# Patient Record
Sex: FEMALE | Race: WHITE | NOT HISPANIC OR LATINO | Employment: OTHER | URBAN - METROPOLITAN AREA
[De-identification: names, ages, dates, MRNs, and addresses within clinical notes are randomized per-mention and may not be internally consistent; named-entity substitution may affect disease eponyms.]

---

## 2018-04-17 ENCOUNTER — APPOINTMENT (OUTPATIENT)
Dept: GENERAL RADIOLOGY | Facility: HOSPITAL | Age: 58
End: 2018-04-17

## 2018-04-17 ENCOUNTER — HOSPITAL ENCOUNTER (EMERGENCY)
Facility: HOSPITAL | Age: 58
Discharge: HOME | End: 2018-04-17
Attending: EMERGENCY MEDICINE | Admitting: EMERGENCY MEDICINE

## 2018-04-17 VITALS
DIASTOLIC BLOOD PRESSURE: 83 MMHG | OXYGEN SATURATION: 99 % | RESPIRATION RATE: 16 BRPM | TEMPERATURE: 97.9 F | HEART RATE: 42 BPM | SYSTOLIC BLOOD PRESSURE: 129 MMHG

## 2018-04-17 DIAGNOSIS — R07.9 CHEST PAIN, UNSPECIFIED TYPE: Primary | ICD-10-CM

## 2018-04-17 LAB
ALBUMIN SERPL-MCNC: 4.1 G/DL (ref 3.2–4.8)
ALBUMIN/GLOB SERPL: 1.2 G/DL (ref 1.5–2.5)
ALP SERPL-CCNC: 93 U/L (ref 25–100)
ALT SERPL W P-5'-P-CCNC: 28 U/L (ref 7–40)
ANION GAP SERPL CALCULATED.3IONS-SCNC: 8 MMOL/L (ref 3–11)
AST SERPL-CCNC: 31 U/L (ref 0–33)
BASOPHILS # BLD AUTO: 0.06 10*3/MM3 (ref 0–0.2)
BASOPHILS NFR BLD AUTO: 1 % (ref 0–1)
BILIRUB SERPL-MCNC: 0.9 MG/DL (ref 0.3–1.2)
BUN BLD-MCNC: 15 MG/DL (ref 9–23)
BUN/CREAT SERPL: 15 (ref 7–25)
CALCIUM SPEC-SCNC: 9.1 MG/DL (ref 8.7–10.4)
CHLORIDE SERPL-SCNC: 107 MMOL/L (ref 99–109)
CO2 SERPL-SCNC: 27 MMOL/L (ref 20–31)
CREAT BLD-MCNC: 1 MG/DL (ref 0.6–1.3)
DEPRECATED RDW RBC AUTO: 52 FL (ref 37–54)
EOSINOPHIL # BLD AUTO: 0.05 10*3/MM3 (ref 0–0.3)
EOSINOPHIL NFR BLD AUTO: 0.8 % (ref 0–3)
ERYTHROCYTE [DISTWIDTH] IN BLOOD BY AUTOMATED COUNT: 13.7 % (ref 11.3–14.5)
GFR SERPL CREATININE-BSD FRML MDRD: 57 ML/MIN/1.73
GLOBULIN UR ELPH-MCNC: 3.5 GM/DL
GLUCOSE BLD-MCNC: 102 MG/DL (ref 70–100)
HCT VFR BLD AUTO: 48.1 % (ref 34.5–44)
HGB BLD-MCNC: 16.3 G/DL (ref 11.5–15.5)
HOLD SPECIMEN: NORMAL
HOLD SPECIMEN: NORMAL
IMM GRANULOCYTES # BLD: 0.01 10*3/MM3 (ref 0–0.03)
IMM GRANULOCYTES NFR BLD: 0.2 % (ref 0–0.6)
INR PPP: 0.96 (ref 0.91–1.09)
LYMPHOCYTES # BLD AUTO: 1.57 10*3/MM3 (ref 0.6–4.8)
LYMPHOCYTES NFR BLD AUTO: 26.3 % (ref 24–44)
MCH RBC QN AUTO: 34.7 PG (ref 27–31)
MCHC RBC AUTO-ENTMCNC: 33.9 G/DL (ref 32–36)
MCV RBC AUTO: 102.3 FL (ref 80–99)
MONOCYTES # BLD AUTO: 0.37 10*3/MM3 (ref 0–1)
MONOCYTES NFR BLD AUTO: 6.2 % (ref 0–12)
NEUTROPHILS # BLD AUTO: 3.92 10*3/MM3 (ref 1.5–8.3)
NEUTROPHILS NFR BLD AUTO: 65.5 % (ref 41–71)
PLATELET # BLD AUTO: 245 10*3/MM3 (ref 150–450)
PMV BLD AUTO: 10.3 FL (ref 6–12)
POTASSIUM BLD-SCNC: 4.6 MMOL/L (ref 3.5–5.5)
PROT SERPL-MCNC: 7.6 G/DL (ref 5.7–8.2)
PROTHROMBIN TIME: 10.1 SECONDS (ref 9.6–11.5)
RBC # BLD AUTO: 4.7 10*6/MM3 (ref 3.89–5.14)
SODIUM BLD-SCNC: 142 MMOL/L (ref 132–146)
TROPONIN I SERPL-MCNC: 0 NG/ML (ref 0–0.07)
TROPONIN I SERPL-MCNC: 0.01 NG/ML (ref 0–0.07)
TSH SERPL DL<=0.05 MIU/L-ACNC: 2.82 MIU/ML (ref 0.35–5.35)
WBC NRBC COR # BLD: 5.98 10*3/MM3 (ref 3.5–10.8)
WHOLE BLOOD HOLD SPECIMEN: NORMAL
WHOLE BLOOD HOLD SPECIMEN: NORMAL

## 2018-04-17 PROCEDURE — 93005 ELECTROCARDIOGRAM TRACING: CPT | Performed by: EMERGENCY MEDICINE

## 2018-04-17 PROCEDURE — 71045 X-RAY EXAM CHEST 1 VIEW: CPT

## 2018-04-17 PROCEDURE — 84443 ASSAY THYROID STIM HORMONE: CPT | Performed by: EMERGENCY MEDICINE

## 2018-04-17 PROCEDURE — 36415 COLL VENOUS BLD VENIPUNCTURE: CPT

## 2018-04-17 PROCEDURE — 85025 COMPLETE CBC W/AUTO DIFF WBC: CPT | Performed by: EMERGENCY MEDICINE

## 2018-04-17 PROCEDURE — 85610 PROTHROMBIN TIME: CPT | Performed by: EMERGENCY MEDICINE

## 2018-04-17 PROCEDURE — 99283 EMERGENCY DEPT VISIT LOW MDM: CPT

## 2018-04-17 PROCEDURE — 84484 ASSAY OF TROPONIN QUANT: CPT

## 2018-04-17 PROCEDURE — 80053 COMPREHEN METABOLIC PANEL: CPT | Performed by: EMERGENCY MEDICINE

## 2018-04-17 RX ORDER — ASPIRIN 81 MG/1
324 TABLET, CHEWABLE ORAL ONCE
Status: DISCONTINUED | OUTPATIENT
Start: 2018-04-17 | End: 2018-04-17 | Stop reason: HOSPADM

## 2018-04-17 RX ORDER — SODIUM CHLORIDE 0.9 % (FLUSH) 0.9 %
10 SYRINGE (ML) INJECTION AS NEEDED
Status: DISCONTINUED | OUTPATIENT
Start: 2018-04-17 | End: 2018-04-17 | Stop reason: HOSPADM

## 2018-04-17 RX ORDER — CALCIUM POLYCARBOPHIL 625 MG 625 MG/1
625 TABLET ORAL DAILY
COMMUNITY

## 2018-04-17 RX ORDER — RANITIDINE 150 MG/1
150 TABLET ORAL DAILY
COMMUNITY

## 2018-04-17 RX ORDER — NYSTATIN 100000 U/G
1 CREAM TOPICAL AS NEEDED
COMMUNITY

## 2018-04-17 RX ORDER — L.ACID,PARA/B.BIFIDUM/S.THERM 8B CELL
1 CAPSULE ORAL DAILY
COMMUNITY

## 2018-04-17 RX ORDER — COLESTIPOL HYDROCHLORIDE 5 G/5G
5 GRANULE, FOR SUSPENSION ORAL DAILY
COMMUNITY

## 2018-04-17 RX ORDER — KETOCONAZOLE 20 MG/ML
1 SHAMPOO TOPICAL DAILY PRN
COMMUNITY

## 2018-04-17 RX ORDER — LEVOTHYROXINE SODIUM 0.1 MG/1
100 TABLET ORAL DAILY
COMMUNITY

## 2018-04-17 NOTE — ED NOTES
pt uncooperative with getting in to bed or allowing ecg to be obtained,  Assist x 2 paramedics to get pt in bed a run ecg.  Pt in bed with sr up x 2.  Staff attendant at bedside       Milagro Flaherty RN  04/17/18 8408

## 2018-04-17 NOTE — ED PROVIDER NOTES
Subjective   43-year-old white female with Down syndrome accompanied by caregiver complaining of chest pain.  According to caregiver staff, she was at her living facility with staff at 7:30 when she stated that her chest hurt.  At one point, she said that she couldn't see.  She has denied any headache, shortness of air, vomiting, or other complaints.  According to staff, they presented to an urgent care facility prior to coming here.  She did not allow them to get an EKG or any blood work and so she was sent to our department.  I have spoken to the patient's sister and power of  who has given us approval to draw blood and do the necessary test even if we have to restrain her.  History from this patient is difficult at best.        Chest Pain   Pain location:  Unable to specify  Onset quality:  Unable to specify  Progression:  Unable to specify  Associated symptoms: no altered mental status, no fever, no shortness of breath, no syncope and no weakness        Review of Systems   Constitutional: Negative for fever.   Respiratory: Negative for shortness of breath.    Cardiovascular: Positive for chest pain. Negative for syncope.   Neurological: Negative for weakness.   All other systems reviewed and are negative.      Past Medical History:   Diagnosis Date   • Constipation    • Diarrhea    • Down's syndrome    • Heartburn    • Thyroid disease        Allergies   Allergen Reactions   • Diflucan [Fluconazole] Unknown (See Comments)     Unknown reaction   • Gluten Meal GI Intolerance   • Adhesive Tape Rash       History reviewed. No pertinent surgical history.    History reviewed. No pertinent family history.    Social History     Social History   • Marital status: Single     Social History Main Topics   • Smoking status: Unknown If Ever Smoked   • Alcohol use Defer   • Drug use: Unknown   • Sexual activity: Defer     Other Topics Concern   • Not on file           Objective   Physical Exam   Constitutional: She  appears well-developed and well-nourished.   HENT:   Head: Normocephalic and atraumatic.   Eyes: Conjunctivae are normal. Pupils are equal, round, and reactive to light.   Neck: Neck supple.   Cardiovascular: Regular rhythm and normal heart sounds.  Exam reveals no friction rub.    No murmur heard.  Sinus bradycardia. Rate 46   Pulmonary/Chest: Effort normal and breath sounds normal. No respiratory distress. She has no wheezes.   Musculoskeletal: Normal range of motion. She exhibits no edema.   Neurological: She is alert.   Skin: Skin is warm and dry. Capillary refill takes less than 2 seconds.   Psychiatric: She has a normal mood and affect.   Nursing note and vitals reviewed.      Procedures         ED Course  ED Course   Comment By Time   I tried to reach pts. POA ( sister ) and no ability to leave message at her number.  I spoke with Dr. Rose.  H&P and results given to him.  He agreed with the plan to have the patient follow-up with the chest pain clinic.  I will make that order.   SARAHI Lyle 04/17 3763        No results found for this or any previous visit (from the past 24 hour(s)).  Note: In addition to lab results from this visit, the labs listed above may include labs taken at another facility or during a different encounter within the last 24 hours. Please correlate lab times with ED admission and discharge times for further clarification of the services performed during this visit.    XR Chest 1 View   Final Result   Chronic lung changes without acute cardiopulmonary process.       D:  04/17/2018   E:  04/17/2018       This report was finalized on 4/17/2018 4:42 PM by Dr. John Espitia.            Vitals:    04/17/18 1143   BP: 129/83   BP Location: Right arm   Patient Position: Lying   Pulse: (!) 42   Resp: 16   Temp: 97.9 °F (36.6 °C)   TempSrc: Oral   SpO2: 99%     Medications - No data to display  ECG/EMG Results (last 24 hours)     Procedure Component Value Units Date/Time    ECG 12 Lead  [[id number]] Collected:  04/17/18 1302     Updated:  04/17/18 1305                [facility]    Final diagnoses:   Chest pain, unspecified type            SARAHI Lyle  04/29/18 1329

## 2018-04-17 NOTE — ED NOTES
"Staff reports \"she always refuses ecg and phlebotomy\"       Milagro Flaherty, RN  04/17/18 0995    "

## 2018-04-17 NOTE — DISCHARGE INSTRUCTIONS
Taken 81 mg aspirin enteric-coated daily.  Follow-up with the cardiologist.  Return if symptoms worsen.

## 2024-08-27 ENCOUNTER — HOSPITAL ENCOUNTER (EMERGENCY)
Facility: HOSPITAL | Age: 64
Discharge: HOME | End: 2024-08-27
Attending: EMERGENCY MEDICINE
Payer: MEDICARE

## 2024-08-27 VITALS
HEIGHT: 59 IN | DIASTOLIC BLOOD PRESSURE: 61 MMHG | SYSTOLIC BLOOD PRESSURE: 140 MMHG | WEIGHT: 130.07 LBS | HEART RATE: 45 BPM | TEMPERATURE: 97.3 F | BODY MASS INDEX: 26.22 KG/M2 | OXYGEN SATURATION: 95 % | RESPIRATION RATE: 18 BRPM

## 2024-08-27 DIAGNOSIS — K59.00 CONSTIPATION, UNSPECIFIED CONSTIPATION TYPE: Primary | ICD-10-CM

## 2024-08-27 PROCEDURE — 99282 EMERGENCY DEPT VISIT SF MDM: CPT

## 2024-08-27 RX ORDER — POLYETHYLENE GLYCOL 3350, SODIUM SULFATE ANHYDROUS, SODIUM BICARBONATE, SODIUM CHLORIDE, POTASSIUM CHLORIDE 236; 22.74; 6.74; 5.86; 2.97 G/4L; G/4L; G/4L; G/4L; G/4L
240 POWDER, FOR SOLUTION ORAL ONCE
Qty: 240 ML | Refills: 0 | Status: SHIPPED | OUTPATIENT
Start: 2024-08-27 | End: 2024-08-27

## 2024-08-27 NOTE — ED PROVIDER NOTES
Subjective   History of Present Illness  64-year-old female with a known history of underlying Down syndrome who presents for evaluation of constipation.  The patient has not had a bowel movement for the last couple days.  Today she was attempting to have a bowel movement per the caretakers report a large bolus of stool was observed that could not be removed.  The caretaker has been able to relieve the patient of such a stool burden in the past.  The patient herself however is not cooperative with staff and will not allow that type of exam to be accomplished here in the ER.  There is no complaint of fever.  The patient is nonverbal at baseline.  However she does listen and follow commands.  No complaints of respiratory symptoms or sick contacts.  No medication adjustments.  No other acute complaints.      Review of Systems   Constitutional:  Negative for chills, fatigue and fever.   HENT:  Negative for congestion, ear pain, postnasal drip, sinus pressure and sore throat.    Eyes:  Negative for pain, redness and visual disturbance.   Respiratory:  Negative for cough, chest tightness and shortness of breath.    Cardiovascular:  Negative for chest pain, palpitations and leg swelling.   Gastrointestinal:  Positive for constipation. Negative for abdominal pain, anal bleeding, blood in stool, diarrhea, nausea and vomiting.   Endocrine: Negative for polydipsia and polyuria.   Genitourinary:  Negative for difficulty urinating, dysuria, frequency and urgency.   Musculoskeletal:  Negative for arthralgias, back pain and neck pain.   Skin:  Negative for pallor and rash.   Allergic/Immunologic: Negative for environmental allergies and immunocompromised state.   Neurological:  Negative for dizziness, weakness and headaches.   Hematological:  Negative for adenopathy.   Psychiatric/Behavioral:  Negative for confusion, self-injury and suicidal ideas. The patient is not nervous/anxious.    All other systems reviewed and are  negative.      Past Medical History:   Diagnosis Date    Constipation     Diarrhea     Down's syndrome     Heartburn     Thyroid disease        Allergies   Allergen Reactions    Diflucan [Fluconazole] Unknown (See Comments)     Unknown reaction    Gluten Meal GI Intolerance    Adhesive Tape Rash       Past Surgical History:   Procedure Laterality Date    CATARACT EXTRACTION, BILATERAL Bilateral 02/26/2024       No family history on file.    Social History     Socioeconomic History    Marital status: Single   Tobacco Use    Smoking status: Never   Vaping Use    Vaping status: Never Used   Substance and Sexual Activity    Alcohol use: Never    Drug use: Defer    Sexual activity: Defer           Objective   Physical Exam  Vitals and nursing note reviewed.   Constitutional:       General: She is not in acute distress.     Appearance: Normal appearance. She is well-developed. She is not toxic-appearing or diaphoretic.   HENT:      Head: Normocephalic and atraumatic.      Right Ear: External ear normal.      Left Ear: External ear normal.      Nose: Nose normal.   Eyes:      General: Lids are normal.      Pupils: Pupils are equal, round, and reactive to light.   Neck:      Trachea: No tracheal deviation.   Cardiovascular:      Rate and Rhythm: Normal rate and regular rhythm.      Pulses: No decreased pulses.      Heart sounds: Normal heart sounds. No murmur heard.     No friction rub. No gallop.   Pulmonary:      Effort: Pulmonary effort is normal. No respiratory distress.      Breath sounds: Normal breath sounds. No decreased breath sounds, wheezing, rhonchi or rales.   Abdominal:      General: Bowel sounds are normal.      Palpations: Abdomen is soft.      Tenderness: There is no abdominal tenderness. There is no guarding or rebound.      Comments: Abdomen is soft and nontender diffusely, normal bowel sounds, no peritoneal signs.   Musculoskeletal:         General: No deformity. Normal range of motion.      Cervical  back: Normal range of motion and neck supple.   Lymphadenopathy:      Cervical: No cervical adenopathy.   Skin:     General: Skin is warm and dry.      Findings: No rash.   Neurological:      Mental Status: She is alert and oriented to person, place, and time.      Cranial Nerves: No cranial nerve deficit.      Sensory: No sensory deficit.   Psychiatric:         Speech: Speech normal.         Behavior: Behavior normal.         Thought Content: Thought content normal.         Judgment: Judgment normal.         Procedures           ED Course                                             Medical Decision Making  Differential diagnosis includes constipation, bowel obstruction, anal fissure, etc    Due to limitation of exam we will not perform a manual disimpaction at this given time.  I had a detailed discussion with the caretaker and with shared decision making we will try a course of GoLytely to help relieve current constipation.    The patient will be advised to follow-up with with primary care physician for recheck in 2 days.    Return to the ER with any further concern.    Problems Addressed:  Constipation, unspecified constipation type: complicated acute illness or injury with systemic symptoms    Risk  Prescription drug management.        Final diagnoses:   Constipation, unspecified constipation type       ED Disposition  ED Disposition       ED Disposition   Discharge    Condition   Stable    Comment   --               Ida Whittington, APRN  [address] [address]  [phone]    In 1 week           Medication List        New Prescriptions      Golytely 236 g solution  Generic drug: polyethylene glycol  Take 240 mL by mouth 1 (One) Time for 1 dose.               Where to Get Your Medications        [facility] [place] - [address] [place] [address] - [phone]  - [phone] [address] [place] [address]      Phone: [phone]   [place]  236 g solution            Leander Bailey MD  08/27/24 0706

## 2024-08-27 NOTE — DISCHARGE INSTRUCTIONS
Drink plenty of fluids.    Engage in regular activity.    Take GoLytely as prescribed and add additional fluid of your choice.     Follow-up with primary care physician for recheck in 2 days and return to the ER as needed.

## 2024-12-06 ENCOUNTER — HOSPITAL ENCOUNTER (EMERGENCY)
Facility: HOSPITAL | Age: 64
Discharge: HOME | End: 2024-12-06
Attending: EMERGENCY MEDICINE
Payer: MEDICARE

## 2024-12-06 VITALS
DIASTOLIC BLOOD PRESSURE: 55 MMHG | OXYGEN SATURATION: 97 % | HEIGHT: 58 IN | HEART RATE: 55 BPM | SYSTOLIC BLOOD PRESSURE: 138 MMHG | BODY MASS INDEX: 27.29 KG/M2 | WEIGHT: 130 LBS | RESPIRATION RATE: 16 BRPM

## 2024-12-06 DIAGNOSIS — Q90.9 HISTORY OF DOWN SYNDROME: ICD-10-CM

## 2024-12-06 DIAGNOSIS — K59.00 CONSTIPATION, UNSPECIFIED CONSTIPATION TYPE: Primary | ICD-10-CM

## 2024-12-06 PROCEDURE — 99282 EMERGENCY DEPT VISIT SF MDM: CPT

## 2024-12-07 NOTE — ED PROVIDER NOTES
"Subjective   History of Present Illness  64-year-old female presents for evaluation of constipation.  The patient has a history of Down syndrome and is accompanied by her caregiver who provides her history.  Per protocol at her facility, the patient was brought here today for constipation and she has not had a good bowel movement for the past 5 days.  She has been using MiraLAX but has been unable to have an adequate bowel movement and as a result was brought here for evaluation.  However, just prior to checking into the emergency department the patient had a large bowel movement in the waiting room bathroom after telling her caregiver that she \"had to go potty.\"  Currently, the patient is completely asymptomatic.  She denies any abdominal pain.  No nausea or vomiting.  She denies any rectal pain.  She has no complaints at this time.      Review of Systems   Gastrointestinal:  Positive for constipation.   All other systems reviewed and are negative.      Past Medical History:   Diagnosis Date    Constipation     Diarrhea     Down's syndrome     Heartburn     Thyroid disease        Allergies   Allergen Reactions    Fluconazole Unknown (See Comments) and Rash     Unknown reaction    Adhesive Tape Rash and Unknown - Low Severity    Gluten Meal GI Intolerance and Unknown (See Comments)       Past Surgical History:   Procedure Laterality Date    CATARACT EXTRACTION, BILATERAL Bilateral 02/26/2024       No family history on file.    Social History     Socioeconomic History    Marital status: Single   Tobacco Use    Smoking status: Never   Vaping Use    Vaping status: Never Used   Substance and Sexual Activity    Alcohol use: Never    Drug use: Defer    Sexual activity: Defer           Objective   Physical Exam  Vitals and nursing note reviewed.   Constitutional:       Appearance: She is well-developed. She is not diaphoretic.      Comments: Nontoxic-appearing female   HENT:      Head: Normocephalic and atraumatic.   Eyes: " "     Pupils: Pupils are equal, round, and reactive to light.   Cardiovascular:      Rate and Rhythm: Normal rate and regular rhythm.      Heart sounds: Normal heart sounds. No murmur heard.     No friction rub. No gallop.   Pulmonary:      Effort: Pulmonary effort is normal. No respiratory distress.      Breath sounds: Normal breath sounds. No wheezing or rales.   Abdominal:      General: Bowel sounds are normal. There is no distension.      Palpations: Abdomen is soft. There is no mass.      Tenderness: There is no abdominal tenderness. There is no guarding or rebound.      Comments: No focal abdominal tenderness, no peritoneal signs, no pain out of proportion to exam   Musculoskeletal:         General: Normal range of motion.   Skin:     General: Skin is warm and dry.      Findings: No erythema or rash.   Neurological:      Mental Status: She is alert.      Comments: Mental status at baseline per caregiver at bedside, ambulatory   Psychiatric:         Mood and Affect: Mood normal.         Procedures           ED Course  ED Course as of 12/06/24 2217   Fri Dec 06, 2024   1107 64-year-old female presents for evaluation of constipation.  The patient has a history of Down syndrome and is accompanied by her caregiver who provides her history.  Per protocol at her facility, the patient was brought here today for constipation as she has not had a good bowel movement for the past 5 days.  She has been using MiraLAX but has been unable to have an adequate bowel movement and as result was brought here to be evaluated.  Just prior to coming back to a room, the patient told her caregiver that she \"had to go potty.\"  She had a substantial bowel movement in the waiting room bathroom and is now completely asymptomatic. [DD]   1108 On exam, the patient is nontoxic-appearing.  Nonsurgical abdomen.  She has no complaints at this time.  I offered to obtain labs and imaging, but the patient's caregiver declined.  I feel that this is " "a completely reasonable approach as she is asymptomatic and just had a large bowel movement.  She has no complaints at this time.  Patient reassured.  I advised her to continue to use stool softeners and we discussed conservative measures to help her with her constipation.  She will follow-up with her primary care physician within the next week.  Agreeable with plan and given appropriate strict return precautions. [DD]      ED Course User Index  [DD] Pino Velazco MD                                             No results found for this or any previous visit (from the past 24 hours).  Note: In addition to lab results from this visit, the labs listed above may include labs taken at another facility or during a different encounter within the last 24 hours. Please correlate lab times with ED admission and discharge times for further clarification of the services performed during this visit.    No orders to display     Vitals:    12/06/24 0952 12/06/24 1100   BP: 138/55    BP Location: Left arm    Patient Position: Sitting    Pulse: (!) 46 55   Resp: 22 16   SpO2: 97% 97%   Weight: 59 kg (130 lb)    Height: 147.3 cm (58\")      Medications - No data to display  ECG/EMG Results (last 24 hours)       ** No results found for the last 24 hours. **          No orders to display                 Medical Decision Making      Final diagnoses:   Constipation, unspecified constipation type   History of Down syndrome       ED Disposition  ED Disposition       ED Disposition   Discharge    Condition   Stable    Comment   --               Ida Whittington, APRN  [address] [facility] [address]  [phone]    In 1 week           Medication List        Stop      amoxicillin 500 MG tablet  Commonly known as: AMOXIL                 Pino Velazco MD  12/06/24 2218    "